# Patient Record
Sex: FEMALE | Race: WHITE
[De-identification: names, ages, dates, MRNs, and addresses within clinical notes are randomized per-mention and may not be internally consistent; named-entity substitution may affect disease eponyms.]

---

## 2018-01-27 PROBLEM — Z00.00 ENCOUNTER FOR PREVENTIVE HEALTH EXAMINATION: Status: ACTIVE | Noted: 2018-01-27

## 2018-03-05 ENCOUNTER — APPOINTMENT (OUTPATIENT)
Dept: CARDIOLOGY | Facility: CLINIC | Age: 58
End: 2018-03-05
Payer: COMMERCIAL

## 2018-03-05 ENCOUNTER — NON-APPOINTMENT (OUTPATIENT)
Age: 58
End: 2018-03-05

## 2018-03-05 VITALS
OXYGEN SATURATION: 98 % | SYSTOLIC BLOOD PRESSURE: 150 MMHG | DIASTOLIC BLOOD PRESSURE: 80 MMHG | HEART RATE: 70 BPM | BODY MASS INDEX: 26.66 KG/M2 | WEIGHT: 160 LBS | HEIGHT: 65 IN

## 2018-03-05 DIAGNOSIS — Z82.5 FAMILY HISTORY OF ASTHMA AND OTHER CHRONIC LOWER RESPIRATORY DISEASES: ICD-10-CM

## 2018-03-05 DIAGNOSIS — Z78.9 OTHER SPECIFIED HEALTH STATUS: ICD-10-CM

## 2018-03-05 DIAGNOSIS — Z83.3 FAMILY HISTORY OF DIABETES MELLITUS: ICD-10-CM

## 2018-03-05 PROCEDURE — 93000 ELECTROCARDIOGRAM COMPLETE: CPT

## 2018-03-05 PROCEDURE — 99244 OFF/OP CNSLTJ NEW/EST MOD 40: CPT

## 2018-03-26 ENCOUNTER — APPOINTMENT (OUTPATIENT)
Dept: CARDIOLOGY | Facility: CLINIC | Age: 58
End: 2018-03-26
Payer: COMMERCIAL

## 2018-03-26 ENCOUNTER — TRANSCRIPTION ENCOUNTER (OUTPATIENT)
Age: 58
End: 2018-03-26

## 2018-03-26 DIAGNOSIS — Z87.891 PERSONAL HISTORY OF NICOTINE DEPENDENCE: ICD-10-CM

## 2018-03-26 PROCEDURE — 93015 CV STRESS TEST SUPVJ I&R: CPT

## 2018-03-26 PROCEDURE — 93306 TTE W/DOPPLER COMPLETE: CPT

## 2018-05-03 ENCOUNTER — APPOINTMENT (OUTPATIENT)
Dept: CARDIOLOGY | Facility: CLINIC | Age: 58
End: 2018-05-03
Payer: COMMERCIAL

## 2018-05-03 VITALS
HEIGHT: 65 IN | HEART RATE: 69 BPM | SYSTOLIC BLOOD PRESSURE: 140 MMHG | BODY MASS INDEX: 26.82 KG/M2 | WEIGHT: 161 LBS | DIASTOLIC BLOOD PRESSURE: 78 MMHG

## 2018-05-03 PROCEDURE — 99215 OFFICE O/P EST HI 40 MIN: CPT

## 2018-07-23 PROBLEM — Z78.9 ALCOHOL USE: Status: ACTIVE | Noted: 2018-03-05

## 2018-11-07 ENCOUNTER — APPOINTMENT (OUTPATIENT)
Dept: CARDIOLOGY | Facility: CLINIC | Age: 58
End: 2018-11-07
Payer: COMMERCIAL

## 2018-11-07 VITALS
HEART RATE: 68 BPM | BODY MASS INDEX: 26.82 KG/M2 | OXYGEN SATURATION: 96 % | SYSTOLIC BLOOD PRESSURE: 148 MMHG | WEIGHT: 161 LBS | DIASTOLIC BLOOD PRESSURE: 80 MMHG | HEIGHT: 65 IN

## 2018-11-07 PROCEDURE — 99214 OFFICE O/P EST MOD 30 MIN: CPT

## 2018-11-07 RX ORDER — ASPIRIN 81 MG
81 TABLET, DELAYED RELEASE (ENTERIC COATED) ORAL DAILY
Refills: 0 | Status: DISCONTINUED | COMMUNITY
End: 2018-11-07

## 2018-11-07 NOTE — HISTORY OF PRESENT ILLNESS
[FreeTextEntry1] : \par 6 years history of hypertension . No history of congestive heart failure, renal insufficiency. Family history of cerebrovascular accidents. Former smoker.\par History of sleep apnea. Has not started CPAP.\par Former smoker.\par No regular exercise at present.\par No history of myocardial infarction, coronary artery disease, cerebrovascular accident, peripheral vascular disease, syncope, claudication, thyroid disorder.\par elevated LDL cholesterol\par Dyspnea on exertion. She has not been doing any exercises recently p.o. once evaluation prior to doing more aggressive exercises.\par No chest pain. No PND, orthopnea. No pedal edema. No syncopal episode.

## 2018-11-07 NOTE — PHYSICAL EXAM
[General Appearance - Well Developed] : well developed [Normal Appearance] : normal appearance [Well Groomed] : well groomed [General Appearance - Well Nourished] : well nourished [No Deformities] : no deformities [General Appearance - In No Acute Distress] : no acute distress [Normal Conjunctiva] : the conjunctiva exhibited no abnormalities [Eyelids - No Xanthelasma] : the eyelids demonstrated no xanthelasmas [Normal Oral Mucosa] : normal oral mucosa [No Oral Pallor] : no oral pallor [Normal Jugular Venous A Waves Present] : normal jugular venous A waves present [Normal Jugular Venous V Waves Present] : normal jugular venous V waves present [No Jugular Venous Minor A Waves] : no jugular venous minor A waves [Respiration, Rhythm And Depth] : normal respiratory rhythm and effort [Exaggerated Use Of Accessory Muscles For Inspiration] : no accessory muscle use [Auscultation Breath Sounds / Voice Sounds] : lungs were clear to auscultation bilaterally [Heart Rate And Rhythm] : heart rate and rhythm were normal [Heart Sounds] : normal S1 and S2 [Murmurs] : no murmurs present [Arterial Pulses Normal] : the arterial pulses were normal [Edema] : no peripheral edema present [Veins - Varicosity Changes] : no varicosital changes were noted in the lower extremities [FreeTextEntry1] : No bruit. No gallop, or. No heaves, no click [Abdomen Soft] : soft [Abdomen Tenderness] : non-tender [Abnormal Walk] : normal gait [Gait - Sufficient For Exercise Testing] : the gait was sufficient for exercise testing [Nail Clubbing] : no clubbing of the fingernails [Cyanosis, Localized] : no localized cyanosis [Skin Color & Pigmentation] : normal skin color and pigmentation [] : no rash [Oriented To Time, Place, And Person] : oriented to person, place, and time [Affect] : the affect was normal [Mood] : the mood was normal [No Anxiety] : not feeling anxious

## 2018-11-07 NOTE — REVIEW OF SYSTEMS
[Dyspnea on exertion] : dyspnea during exertion [Chest  Pressure] : no chest pressure [Chest Pain] : no chest pain [Lower Ext Edema] : no extremity edema [Leg Claudication] : no intermittent leg claudication [Palpitations] : no palpitations [Negative] : Heme/Lymph

## 2018-11-07 NOTE — REASON FOR VISIT
[Follow-Up - Clinic] : a clinic follow-up of [Hyperlipidemia] : hyperlipidemia [Hypertension] : hypertension [FreeTextEntry1] : 57-year-old is seen in followup consultation to review, labs,\par She offers no new changes in her symptoms.\par She has not been able to obtain CPAP machine yet.

## 2018-11-07 NOTE — DISCUSSION/SUMMARY
[Patient] : the patient [Risks] : risks [Benefits] : benefits [Alternatives] : alternatives [FreeTextEntry1] : 58-year-old  former smoker. History of hypertension. Mildly uncontrolled, . Recently. History of untreated sleep apnea. low  exercise tolerance \par Neck preserved systolic function, nonischemic stress test at moderate level of workload for her age, lipid panel with elevated triglycerides, low HDL, significantly elevated LDL cholesterol with 5.6% 10 year ASCVD, risk, 50% lifetime risk and 1.3%. Optimal ASCVD risk\par \par Importance of aggressive lifestyle modification again reviewed.\par Her blood pressure readings at home are normal and less than 130/80. According to her. She will keep eye on that. She will have the blood pressure check in your office again in 2-3 weeks.\par If persistent greater than 130/80. Couldn't see the increased dose of ACE inhibitor.\par Again, lipid panel was reviewed with her at length.\par Again recommended to consider statin therapy concerning dietary restrictions and exercise program. Is not helping her lipid panel. Over last 6 months.\par Reviewed the risk and benefits of lipid lowering agents again. She will think about it and let me know.\par \par I also recommended to be more aggressive with her just a medical regimen. She understands importance of blood pressure control for long-term management. And prevention of cardiovascular disease. She understands low salt, and alcohol intake, increasing exercise. And, for blood pressure less than 130/80, preferably closer to 120/80.\par \par Sleep apnea management.\par \par Counseling regarding low saturated fat, low carbohydrate intake was reviewed. Active lifestyle and regular. Exercise is along with weight maintenance is advised.\par Counseling regarding low salt diet, active lifestyle, regular exercise, and weight maintenance was reviewed.\par Fasting lipid panel, if it's not done.\par Based on above, will discuss if any limitations.\par \par All the above were at length reviewed. Answered all the questions. Thank you very much for this kind referral. Please do not hesitate to give me a call for any question.\par Part of this transcription was done with voice recognition software and phonetically similar errors are common. I apologize for that. Please donot hesitate to call for any questions due to above.\par \par Followup  6 months

## 2018-11-07 NOTE — ASSESSMENT
[FreeTextEntry1] : EKG were ordered and interpreted by me on March 5, 2018 indication hypertension. Dyspnea. Interpretation. Normal sinus rhythm.\par \par Reviewed May 3, 2018.\par Echocardiogram March 26, 2018. LV ejection fraction 65%.\par Exercise treadmill stress test via March 26, 2018. 6 minutes of Rogers protocol. Nonischemic symptoms. 89%. 7 mets. Nonischemic EKG portion of the test. No significant cardiac arrhythmias. \par Lipid panel, May 2, 2018. HDL 44, , total cholesterol 262. Triglycerides 178.\par \par Reviewed on November 7, 2018.\par Labs from November 6, 2018 showed normal CMP. LDL cholesterol 188, HDL 39, triglycerides 2 or 3

## 2019-04-11 ENCOUNTER — RECORD ABSTRACTING (OUTPATIENT)
Age: 59
End: 2019-04-11

## 2019-04-11 ENCOUNTER — APPOINTMENT (OUTPATIENT)
Dept: CARDIOLOGY | Facility: CLINIC | Age: 59
End: 2019-04-11
Payer: COMMERCIAL

## 2019-04-11 ENCOUNTER — NON-APPOINTMENT (OUTPATIENT)
Age: 59
End: 2019-04-11

## 2019-04-11 VITALS
BODY MASS INDEX: 25.66 KG/M2 | WEIGHT: 154 LBS | OXYGEN SATURATION: 98 % | HEART RATE: 73 BPM | HEIGHT: 65 IN | SYSTOLIC BLOOD PRESSURE: 124 MMHG | DIASTOLIC BLOOD PRESSURE: 80 MMHG

## 2019-04-11 PROCEDURE — 99214 OFFICE O/P EST MOD 30 MIN: CPT

## 2019-04-11 PROCEDURE — 93000 ELECTROCARDIOGRAM COMPLETE: CPT

## 2019-04-11 NOTE — PHYSICAL EXAM
[General Appearance - Well Developed] : well developed [Normal Appearance] : normal appearance [General Appearance - Well Nourished] : well nourished [Well Groomed] : well groomed [No Deformities] : no deformities [General Appearance - In No Acute Distress] : no acute distress [Normal Conjunctiva] : the conjunctiva exhibited no abnormalities [Normal Oral Mucosa] : normal oral mucosa [Eyelids - No Xanthelasma] : the eyelids demonstrated no xanthelasmas [Normal Jugular Venous A Waves Present] : normal jugular venous A waves present [No Oral Pallor] : no oral pallor [No Jugular Venous Minor A Waves] : no jugular venous minor A waves [Normal Jugular Venous V Waves Present] : normal jugular venous V waves present [Respiration, Rhythm And Depth] : normal respiratory rhythm and effort [Auscultation Breath Sounds / Voice Sounds] : lungs were clear to auscultation bilaterally [Exaggerated Use Of Accessory Muscles For Inspiration] : no accessory muscle use [Heart Rate And Rhythm] : heart rate and rhythm were normal [Heart Sounds] : normal S1 and S2 [Arterial Pulses Normal] : the arterial pulses were normal [Murmurs] : no murmurs present [Edema] : no peripheral edema present [Abdomen Soft] : soft [Veins - Varicosity Changes] : no varicosital changes were noted in the lower extremities [Abdomen Tenderness] : non-tender [Gait - Sufficient For Exercise Testing] : the gait was sufficient for exercise testing [Nail Clubbing] : no clubbing of the fingernails [Abnormal Walk] : normal gait [Cyanosis, Localized] : no localized cyanosis [Skin Color & Pigmentation] : normal skin color and pigmentation [Affect] : the affect was normal [Oriented To Time, Place, And Person] : oriented to person, place, and time [] : no rash [No Anxiety] : not feeling anxious [Mood] : the mood was normal [FreeTextEntry1] : No bruit. No gallop, or. No heaves, no click

## 2019-04-11 NOTE — HISTORY OF PRESENT ILLNESS
[FreeTextEntry1] : 58 years history of hypertension . No history of congestive heart failure, renal insufficiency. Family history of cerebrovascular accidents. Former smoker.\par History of sleep apnea. Has not started CPAP.\par Former smoker.\par No regular exercise at present.\par No history of myocardial infarction, coronary artery disease, cerebrovascular accident, peripheral vascular disease, syncope, claudication, thyroid disorder.\par elevated LDL cholesterol\par Dyspnea on exertion. She has not been doing any exercises recently p.o. once evaluation prior to doing more aggressive exercises.\par No chest pain. No PND, orthopnea. No pedal edema. No syncopal episode.

## 2019-04-11 NOTE — REASON FOR VISIT
[Follow-Up - Clinic] : a clinic follow-up of [Hyperlipidemia] : hyperlipidemia [Hypertension] : hypertension [FreeTextEntry1] : 58-year-old is seen in followup consultation to review, labs,\par Overall she has been doing very well. Working with nutritionists. Doing more exercise. Controlling her diet. Feeling overall well. Decreased fatigue. He improved. Her psychological health has improved.\par She has no chest pain, PND, or orthopnea.\par She has no palpitations, dizziness, syncopal episode.\par She has no claudication pain.\par Her weight has decreased.\par No recent hospitalization.\par Her CPAP machine was not approved

## 2019-04-11 NOTE — ASSESSMENT
[FreeTextEntry1] : EKG were ordered and interpreted by me on March 5, 2018 indication hypertension. Dyspnea. Interpretation. Normal sinus rhythm.\par \par Reviewed May 3, 2018.\par Echocardiogram March 26, 2018. LV ejection fraction 65%.\par Exercise treadmill stress test via March 26, 2018. 6 minutes of Rogers protocol. Nonischemic symptoms. 89%. 7 mets. Nonischemic EKG portion of the test. No significant cardiac arrhythmias. \par Lipid panel, May 2, 2018. HDL 44, , total cholesterol 262. Triglycerides 178.\par \par Reviewed on November 7, 2018.\par Labs from November 6, 2018 showed normal CMP. LDL cholesterol 188, HDL 39, triglycerides 2 or 3\par \par Reviewed on a Prelone 2019\par Labs from February 12, 2019, HDL 39, total cholesterol 98, TSH 1.42, . Triglycerides 131 CBC normal.\par No BMP available.\par EKG. November 11, 2019 indication essential hypertension. Interpretation.  normal sinus rhythm

## 2019-04-11 NOTE — REVIEW OF SYSTEMS
[Dyspnea on exertion] : not dyspnea during exertion [Chest  Pressure] : no chest pressure [Lower Ext Edema] : no extremity edema [Chest Pain] : no chest pain [Leg Claudication] : no intermittent leg claudication [Palpitations] : no palpitations [Negative] : Cardiovascular

## 2019-04-11 NOTE — DISCUSSION/SUMMARY
[Patient] : the patient [Risks] : risks [Alternatives] : alternatives [Benefits] : benefits [FreeTextEntry1] : 58-year-old former smoker comes in for followup consultation with labs and EKG. Which were reviewed. above medical history. Following active problems.\par #1 essential hypertension. Much better controlled. Continue present regimen of, medication. We'll follow BMP. A prescription done.\par #2 hyperlipidemia. Improved 10 year ASCVD, risks by 1%. Continue lifestyle modifications\par #3 sleep apnea. Evaluation as premature office. Does need CPAP. Recommended to see a pulmonologist.\par #4 overweight state. Result. Present with normal BMI.\par \par Counseling regarding low saturated fat, salt and carbohydrate intake was reviewed. Active lifestyle and regular. Exercise along with weight management is advised.\par All the above were at length reviewed. Answered all the questions. Thank you very much for this kind referral. Please do not hesitate to give me a call for any question.\par Part of this transcription was done with voice recognition software and phonetically similar errors are common. I apologize for that. Please donot hesitate to call for any questions due to above.\par \par

## 2019-11-12 ENCOUNTER — MOBILE ON CALL (OUTPATIENT)
Age: 59
End: 2019-11-12

## 2019-11-14 ENCOUNTER — APPOINTMENT (OUTPATIENT)
Dept: CARDIOLOGY | Facility: CLINIC | Age: 59
End: 2019-11-14

## 2020-01-15 ENCOUNTER — TRANSCRIPTION ENCOUNTER (OUTPATIENT)
Age: 60
End: 2020-01-15

## 2020-02-21 ENCOUNTER — APPOINTMENT (OUTPATIENT)
Dept: CARDIOLOGY | Facility: CLINIC | Age: 60
End: 2020-02-21
Payer: COMMERCIAL

## 2020-02-21 VITALS
HEART RATE: 63 BPM | WEIGHT: 154 LBS | DIASTOLIC BLOOD PRESSURE: 70 MMHG | BODY MASS INDEX: 25.66 KG/M2 | SYSTOLIC BLOOD PRESSURE: 158 MMHG | HEIGHT: 65 IN | OXYGEN SATURATION: 100 %

## 2020-02-21 PROCEDURE — 99214 OFFICE O/P EST MOD 30 MIN: CPT

## 2020-02-21 RX ORDER — LISINOPRIL 20 MG/1
20 TABLET ORAL DAILY
Qty: 90 | Refills: 3 | Status: DISCONTINUED | COMMUNITY
Start: 2018-03-05 | End: 2020-02-21

## 2020-02-21 NOTE — DISCUSSION/SUMMARY
[Risks] : risks [Patient] : the patient [Benefits] : benefits [Alternatives] : alternatives [FreeTextEntry1] : 59-year-old former smoker comes in for followup consultation with labs and EKG. Which were reviewed. above medical history. Following active problems.\par #1 essential hypertension.  Uncontrolled.  Noncompliance with dietary restrictions and exercise noted.  We will change lisinopril to amlodipine/benazepril.  Side effects profile including ankle edema dizziness fatigue have been reviewed.  She will have repeat blood pressure check in 6 to 8 weeks.  She is also asked to get home monitoring and let us know if persistent elevation of blood pressure at home.\par Aggressive lifestyle modification again reviewed as part of the overall management of her hypertension.  We have reviewed goals with her.  Continue lower caffeine intake and alcohol intake has been discussed\par Continue use of CPAP on a regular basis.  If any significant side effects or change in her symptoms of persistent elevation of blood pressure she will contact us.\par #2 hyperlipidemia. Improved 10 year ASCVD, risks by 1%. Continue lifestyle modifications\par #3 sleep apnea.  Continue use of CPAP\par \par Counseling regarding low saturated fat, salt and carbohydrate intake was reviewed. Active lifestyle and regular. Exercise along with weight management is advised.\par All the above were at length reviewed. Answered all the questions. Thank you very much for this kind referral. Please do not hesitate to give me a call for any question.\par Part of this transcription was done with voice recognition software and phonetically similar errors are common. I apologize for that. Please donot hesitate to call for any questions due to above.\par \par

## 2020-02-21 NOTE — PHYSICAL EXAM
[General Appearance - Well Developed] : well developed [Well Groomed] : well groomed [Normal Appearance] : normal appearance [General Appearance - Well Nourished] : well nourished [No Deformities] : no deformities [General Appearance - In No Acute Distress] : no acute distress [Eyelids - No Xanthelasma] : the eyelids demonstrated no xanthelasmas [Normal Conjunctiva] : the conjunctiva exhibited no abnormalities [Normal Oral Mucosa] : normal oral mucosa [No Oral Pallor] : no oral pallor [Normal Jugular Venous A Waves Present] : normal jugular venous A waves present [No Jugular Venous Minor A Waves] : no jugular venous minor A waves [Normal Jugular Venous V Waves Present] : normal jugular venous V waves present [Respiration, Rhythm And Depth] : normal respiratory rhythm and effort [Auscultation Breath Sounds / Voice Sounds] : lungs were clear to auscultation bilaterally [Exaggerated Use Of Accessory Muscles For Inspiration] : no accessory muscle use [Heart Rate And Rhythm] : heart rate and rhythm were normal [Heart Sounds] : normal S1 and S2 [Murmurs] : no murmurs present [Arterial Pulses Normal] : the arterial pulses were normal [Edema] : no peripheral edema present [Veins - Varicosity Changes] : no varicosital changes were noted in the lower extremities [Abdomen Soft] : soft [Abnormal Walk] : normal gait [Abdomen Tenderness] : non-tender [Gait - Sufficient For Exercise Testing] : the gait was sufficient for exercise testing [Nail Clubbing] : no clubbing of the fingernails [Skin Color & Pigmentation] : normal skin color and pigmentation [Cyanosis, Localized] : no localized cyanosis [Oriented To Time, Place, And Person] : oriented to person, place, and time [] : no rash [Affect] : the affect was normal [Mood] : the mood was normal [No Anxiety] : not feeling anxious [FreeTextEntry1] : No bruit. No gallop, or. No heaves, no click

## 2020-02-21 NOTE — REVIEW OF SYSTEMS
[Negative] : Heme/Lymph [Dyspnea on exertion] : not dyspnea during exertion [Chest Pain] : no chest pain [Chest  Pressure] : no chest pressure [Palpitations] : no palpitations [Leg Claudication] : no intermittent leg claudication [Lower Ext Edema] : no extremity edema

## 2020-02-21 NOTE — HISTORY OF PRESENT ILLNESS
[FreeTextEntry1] :  history of hypertension . No history of congestive heart failure, renal insufficiency. Family history of cerebrovascular accidents. Former smoker.\par History of sleep apnea.  Recently using CPAP\par Former smoker.\par No history of myocardial infarction, coronary artery disease, cerebrovascular accident, peripheral vascular disease, syncope, claudication, thyroid disorder.\par elevated LDL cholesterol\par

## 2020-02-21 NOTE — ASSESSMENT
[FreeTextEntry1] : EKG were ordered and interpreted by me on March 5, 2018 indication hypertension. Dyspnea. Interpretation. Normal sinus rhythm.\par \par Reviewed May 3, 2018.\par Echocardiogram March 26, 2018. LV ejection fraction 65%.\par Exercise treadmill stress test via March 26, 2018. 6 minutes of Rogers protocol. Nonischemic symptoms. 89%. 7 mets. Nonischemic EKG portion of the test. No significant cardiac arrhythmias. \par Lipid panel, May 2, 2018. HDL 44, , total cholesterol 262. Triglycerides 178.\par \par Reviewed on November 7, 2018.\par Labs from November 6, 2018 showed normal CMP. LDL cholesterol 188, HDL 39, triglycerides 2 or 3\par \par Reviewed on a Prelone 2019\par Labs from February 12, 2019, HDL 39, total cholesterol 98, TSH 1.42, . Triglycerides 131 CBC normal.\par No BMP available.\par EKG. November 11, 2019 indication essential hypertension. Interpretation.  normal sinus rhythm\par \par Reviewed on February 21, 2020.\par Labs February 20, 2020.  Normal CBC.  Sodium 141 potassium 4.1 creatinine 0.79 LFT normal.

## 2020-02-21 NOTE — REASON FOR VISIT
[Follow-Up - Clinic] : a clinic follow-up of [Hyperlipidemia] : hyperlipidemia [Hypertension] : hypertension [Medication Management] : Medication management [FreeTextEntry1] : 59-year-old is seen in followup consultation for management of her hypertension.  Since last seen it appears she did not control her diet well.  She was traveling.  Not doing regular exercise.  Unable to lose weight.\par She has been compliant with her medications though.\par She has no significant side effects with her antihypertensive therapy.\par In the past with lifestyle modification which includes diet and exercise her blood pressure was much better control.\par She has started using her CPAP machine recently.  And trying to get used to it.\par She has no chest pain, PND, or orthopnea.\par She has no palpitations, dizziness, syncopal episode.\par She has no claudication pain.\par No recent hospitalization.\par

## 2020-04-20 ENCOUNTER — APPOINTMENT (OUTPATIENT)
Dept: CARDIOLOGY | Facility: CLINIC | Age: 60
End: 2020-04-20

## 2020-07-02 ENCOUNTER — APPOINTMENT (OUTPATIENT)
Dept: CARDIOLOGY | Facility: CLINIC | Age: 60
End: 2020-07-02

## 2020-09-15 ENCOUNTER — APPOINTMENT (OUTPATIENT)
Dept: CARDIOLOGY | Facility: CLINIC | Age: 60
End: 2020-09-15
Payer: COMMERCIAL

## 2020-09-15 VITALS — SYSTOLIC BLOOD PRESSURE: 117 MMHG | DIASTOLIC BLOOD PRESSURE: 74 MMHG

## 2020-09-15 VITALS — BODY MASS INDEX: 25.83 KG/M2 | HEIGHT: 65 IN | WEIGHT: 155 LBS

## 2020-09-15 DIAGNOSIS — E78.5 HYPERLIPIDEMIA, UNSPECIFIED: ICD-10-CM

## 2020-09-15 DIAGNOSIS — G47.33 OBSTRUCTIVE SLEEP APNEA (ADULT) (PEDIATRIC): ICD-10-CM

## 2020-09-15 DIAGNOSIS — E66.3 OVERWEIGHT: ICD-10-CM

## 2020-09-15 DIAGNOSIS — I10 ESSENTIAL (PRIMARY) HYPERTENSION: ICD-10-CM

## 2020-09-15 PROCEDURE — 99214 OFFICE O/P EST MOD 30 MIN: CPT | Mod: 95

## 2020-09-15 RX ORDER — AMLODIPINE BESYLATE AND BENAZEPRIL HYDROCHLORIDE 5; 20 MG/1; MG/1
5-20 CAPSULE ORAL
Qty: 90 | Refills: 3 | Status: ACTIVE | COMMUNITY
Start: 2020-02-21 | End: 1900-01-01

## 2020-09-15 RX ORDER — HYDROCHLOROTHIAZIDE 12.5 MG/1
12.5 TABLET ORAL DAILY
Qty: 90 | Refills: 3 | Status: ACTIVE | COMMUNITY
Start: 2019-11-12 | End: 1900-01-01

## 2020-09-15 NOTE — REVIEW OF SYSTEMS
[Negative] : Heme/Lymph [Dyspnea on exertion] : not dyspnea during exertion [Chest Pain] : no chest pain [Chest  Pressure] : no chest pressure [Leg Claudication] : no intermittent leg claudication [Lower Ext Edema] : no extremity edema [Palpitations] : no palpitations [Under Stress] : under stress [see HPI] : see HPI

## 2020-09-15 NOTE — HISTORY OF PRESENT ILLNESS
[FreeTextEntry1] : Consent: Patient consented to virtual video visit.\par Time: A total of 18 minutes was spent in review of pertinent medical records, discussion with the patient, evaluation of the patient problem and coordination of the care plan as part of this online visit.\par Consent was obtained\par Patient at work\par I was at [employer].\par \par Physically evaluation was not performed as I judged the risk of COVID-19 cross-contamination to be greater than benefit to the patient conferred by the physical examination.\par \par 59-year-old female was seen in 2 way audiovisual visit.\par Since last seen it appears she has been under a lot of stress related to COVID-19 and work.  It is improving gradually.\par Her blood pressure is much better controlled now and less than 130/80 at home.  She has been compliant with her medication.  She walks 45 minutes 5 times a week.  She has no chest pain PND orthopnea pedal edema palpitation lightheaded dizziness.\par Her weight is stable\par Dietary restrictions are stable.\par No recent hospital admission\par \par HPI:\par  history of hypertension . No history of congestive heart failure, renal insufficiency. Family history of cerebrovascular accidents. Former smoker.\par History of sleep apnea.  Recently using CPAP\par Former smoker.\par No history of myocardial infarction, coronary artery disease, cerebrovascular accident, peripheral vascular disease, syncope, claudication, thyroid disorder.\par elevated LDL cholesterol\par

## 2020-09-15 NOTE — HISTORY OF PRESENT ILLNESS
[FreeTextEntry1] : Consent: Patient consented to virtual video visit.\par Time: A total of 18 minutes was spent in review of pertinent medical records, discussion with the patient, evaluation of the patient problem and coordination of the care plan as part of this online visit.\par Consent was obtained\par Patient at work\par I was at [employer] [facility] [employer].\par \par Physically evaluation was not performed as I judged the risk of COVID-19 cross-contamination to be greater than benefit to the patient conferred by the physical examination.\par \par 59-year-old female was seen in 2 way audiovisual visit.\par Since last seen it appears she has been under a lot of stress related to COVID-19 and work.  It is improving gradually.\par Her blood pressure is much better controlled now and less than 130/80 at home.  She has been compliant with her medication.  She walks 45 minutes 5 times a week.  She has no chest pain PND orthopnea pedal edema palpitation lightheaded dizziness.\par Her weight is stable\par Dietary restrictions are stable.\par No recent hospital admission\par \par HPI:\par  history of hypertension . No history of congestive heart failure, renal insufficiency. Family history of cerebrovascular accidents. Former smoker.\par History of sleep apnea.  Recently using CPAP\par Former smoker.\par No history of myocardial infarction, coronary artery disease, cerebrovascular accident, peripheral vascular disease, syncope, claudication, thyroid disorder.\par elevated LDL cholesterol\par

## 2020-09-15 NOTE — ASSESSMENT
[FreeTextEntry1] : EKG were ordered and interpreted by me on March 5, 2018 indication hypertension. Dyspnea. Interpretation. Normal sinus rhythm.\par \par Reviewed May 3, 2018.\par Echocardiogram March 26, 2018. LV ejection fraction 65%.\par Exercise treadmill stress test via March 26, 2018. 6 minutes of Rogers protocol. Nonischemic symptoms. 89%. 7 mets. Nonischemic EKG portion of the test. No significant cardiac arrhythmias. \par Lipid panel, May 2, 2018. HDL 44, , total cholesterol 262. Triglycerides 178.\par \par Reviewed on November 7, 2018.\par Labs from November 6, 2018 showed normal CMP. LDL cholesterol 188, HDL 39, triglycerides 2 or 3\par \par Reviewed on a Prelone 2019\par Labs from February 12, 2019, HDL 39, total cholesterol 98, TSH 1.42, . Triglycerides 131 CBC normal.\par No BMP available.\par EKG. November 11, 2019 indication essential hypertension. Interpretation.  normal sinus rhythm\par \par Reviewed on February 21, 2020.\par Labs February 20, 2020.  Normal CBC.  Sodium 141 potassium 4.1 creatinine 0.79 LFT normal. Wheelchair/Stroller

## 2020-09-15 NOTE — DISCUSSION/SUMMARY
[Patient] : the patient [Risks] : risks [Benefits] : benefits [Alternatives] : alternatives [FreeTextEntry1] : 59-year-old former smoker comes in for followup consultation with labs and EKG. Which were reviewed. above medical history. Following active problems.\par #1 essential hypertension.  Much better controlled.\par Lifestyle modification which includes low-salt diet regular exercise were reviewed with her.\par Compliance with medication discussed.\par Labs are essential and will be ordered for her.\par Prescription for medications done.\par Goal less than 130/80 reviewed.\par #2 hyperlipidemia. Improved 10 year ASCVD, risks by 1%. Continue lifestyle modifications.  Fasting lipid panel ordered.\par #3 sleep apnea.  Continue use of CPAP.  Compliance with CPAP use was reviewed.\par 4.  Stress management\par 5.  Preventive care with your office\par \par Counseling regarding low saturated fat, salt and carbohydrate intake was reviewed. Active lifestyle and regular. Exercise along with weight management is advised.\par All the above were at length reviewed. Answered all the questions. Thank you very much for this kind referral. Please do not hesitate to give me a call for any question.\par Part of this transcription was done with voice recognition software and phonetically similar errors are common. I apologize for that. Please donot hesitate to call for any questions due to above.\par \par

## 2020-09-15 NOTE — REVIEW OF SYSTEMS
[Negative] : Heme/Lymph [Chest  Pressure] : no chest pressure [Chest Pain] : no chest pain [Dyspnea on exertion] : not dyspnea during exertion [Lower Ext Edema] : no extremity edema [Leg Claudication] : no intermittent leg claudication [see HPI] : see HPI [Palpitations] : no palpitations [Under Stress] : under stress

## 2021-09-21 ENCOUNTER — APPOINTMENT (OUTPATIENT)
Dept: CARDIOLOGY | Facility: CLINIC | Age: 61
End: 2021-09-21